# Patient Record
Sex: MALE | Employment: FULL TIME | URBAN - METROPOLITAN AREA
[De-identification: names, ages, dates, MRNs, and addresses within clinical notes are randomized per-mention and may not be internally consistent; named-entity substitution may affect disease eponyms.]

---

## 2023-08-01 ENCOUNTER — OFFICE VISIT (OUTPATIENT)
Dept: FAMILY MEDICINE | Facility: CLINIC | Age: 38
End: 2023-08-01
Payer: COMMERCIAL

## 2023-08-01 VITALS
DIASTOLIC BLOOD PRESSURE: 68 MMHG | WEIGHT: 193 LBS | SYSTOLIC BLOOD PRESSURE: 107 MMHG | TEMPERATURE: 97.9 F | OXYGEN SATURATION: 99 % | HEART RATE: 76 BPM | RESPIRATION RATE: 14 BRPM

## 2023-08-01 DIAGNOSIS — R51.9 NONINTRACTABLE EPISODIC HEADACHE, UNSPECIFIED HEADACHE TYPE: Primary | ICD-10-CM

## 2023-08-01 PROCEDURE — 99203 OFFICE O/P NEW LOW 30 MIN: CPT | Performed by: PHYSICIAN ASSISTANT

## 2023-08-01 RX ORDER — OMEGA-3 FATTY ACIDS/FISH OIL 300-1000MG
200 CAPSULE ORAL EVERY 4 HOURS PRN
COMMUNITY

## 2023-08-01 RX ORDER — SUMATRIPTAN 50 MG/1
50 TABLET, FILM COATED ORAL ONCE
Status: COMPLETED | OUTPATIENT
Start: 2023-08-01 | End: 2023-08-01

## 2023-08-01 RX ORDER — SUMATRIPTAN 50 MG/1
50 TABLET, FILM COATED ORAL
Qty: 30 TABLET | Refills: 0 | Status: SHIPPED | OUTPATIENT
Start: 2023-08-01

## 2023-08-01 RX ADMIN — SUMATRIPTAN 50 MG: 50 TABLET, FILM COATED ORAL at 19:38

## 2023-08-02 NOTE — PROGRESS NOTES
Patient presents with:  Headache: Intermittent headache x 1 month        Clinical Decision Making:  Patient received Imitrex here in the clinic.  He did feel that it was starting to cause his headache to subside.  For this reason Imitrex was sent to the patient's pharmacy.  It is unclear if this is tension headache versus migraine.  There does not seem to be any associated aura.  No neurologic deficits concerning for stroke, intracranial hemorrhage, or mass.    ICD-10-CM    1. Nonintractable episodic headache, unspecified headache type  R51.9 SUMAtriptan (IMITREX) tablet 50 mg     SUMAtriptan (IMITREX) 50 MG tablet          Patient Instructions   1.  Drink plenty of fluids and get  adequate sleep.  Apply a warm compress to the shoulders and a cool compress over the forehead, this contrast can sometimes help with tension type headaches.  2.  If you can afford it getting relaxation massage may help decrease your neck and shoulder tension and relieve your headache pain.  3.  You may continue to use Tylenol as you have been. At first sign of headache take Imitrex. If you still have a headache 2 hours after the dose you can take a second pill. No more than 4 in a 24 hour period.   4.  Follow-up if you are not experience any improvement in your symptoms over the course the next 7-10 days.  5.  Seek emergency medical attention if you develop weakness, confusion, or severe headache, dizziness, or significant vision changes.      HPI:  Alea Estrella is a 38 year old male who presents today complaining of headache on and off x 1 month. Current headache is on the left side of the head. HA usually lasts 3-4 hours. He feels like HA is worsened by his hot working conditions and lack of sleep. He denies any eye watering or nose running with the HA. No hx of migraines. He has had some occasional of nausea and one episode of vomiting associated with HA. He states that he lift a lot at work and reports some tension in his upper  shoulders and neck. He denies any photo or phonophobia. He reports sometimes the pain starts behind and eye and then spreads out, but sometimes its all around the head.     History obtained from the patient.    Problem List:  There are no relevant problems documented for this patient.      No past medical history on file.    Social History     Tobacco Use    Smoking status: Every Day     Types: Cigarettes, Vaping Device    Smokeless tobacco: Current   Substance Use Topics    Alcohol use: Never       Review of Systems    Vitals:    08/01/23 1925   BP: 107/68   Pulse: 76   Resp: 14   Temp: 97.9  F (36.6  C)   SpO2: 99%   Weight: 87.5 kg (193 lb)       Physical Exam  Vitals and nursing note reviewed.   Constitutional:       General: He is not in acute distress.     Appearance: He is not toxic-appearing or diaphoretic.      Comments: Patient holding his left eye when I enter the room.    HENT:      Head: Normocephalic and atraumatic.      Right Ear: External ear normal.      Left Ear: External ear normal.   Eyes:      General:         Right eye: No discharge.         Left eye: No discharge.      Extraocular Movements: Extraocular movements intact.      Conjunctiva/sclera: Conjunctivae normal.      Right eye: Right conjunctiva is not injected.      Left eye: Left conjunctiva is not injected.      Pupils: Pupils are equal, round, and reactive to light.      Comments: No eye watering   Cardiovascular:      Heart sounds: No murmur heard.  Pulmonary:      Effort: Pulmonary effort is normal. No respiratory distress.   Neurological:      Mental Status: He is alert.      GCS: GCS eye subscore is 4. GCS verbal subscore is 5. GCS motor subscore is 6.   Psychiatric:         Mood and Affect: Mood normal.         Behavior: Behavior normal.         Thought Content: Thought content normal.         Judgment: Judgment normal.       At the end of the encounter, I discussed results, diagnosis, medications. Discussed red flags for  immediate return to clinic/ER, as well as indications for follow up if no improvement. Patient understood and agreed to plan. Patient was stable for discharge.

## 2023-08-02 NOTE — PATIENT INSTRUCTIONS
1.  Drink plenty of fluids and get  adequate sleep.  Apply a warm compress to the shoulders and a cool compress over the forehead, this contrast can sometimes help with tension type headaches.  2.  If you can afford it getting relaxation massage may help decrease your neck and shoulder tension and relieve your headache pain.  3.  You may continue to use Tylenol as you have been. At first sign of headache take Imitrex. If you still have a headache 2 hours after the dose you can take a second pill. No more than 4 in a 24 hour period.   4.  Follow-up if you are not experience any improvement in your symptoms over the course the next 7-10 days.  5.  Seek emergency medical attention if you develop weakness, confusion, or severe headache, dizziness, or significant vision changes.

## 2023-12-01 ENCOUNTER — ANCILLARY PROCEDURE (OUTPATIENT)
Dept: GENERAL RADIOLOGY | Facility: CLINIC | Age: 38
End: 2023-12-01
Attending: FAMILY MEDICINE
Payer: COMMERCIAL

## 2023-12-01 ENCOUNTER — OFFICE VISIT (OUTPATIENT)
Dept: FAMILY MEDICINE | Facility: CLINIC | Age: 38
End: 2023-12-01
Payer: COMMERCIAL

## 2023-12-01 VITALS
HEIGHT: 68 IN | SYSTOLIC BLOOD PRESSURE: 109 MMHG | DIASTOLIC BLOOD PRESSURE: 75 MMHG | WEIGHT: 166 LBS | HEART RATE: 96 BPM | OXYGEN SATURATION: 98 % | BODY MASS INDEX: 25.16 KG/M2

## 2023-12-01 DIAGNOSIS — Z23 COVID-19 VACCINE ADMINISTERED: ICD-10-CM

## 2023-12-01 DIAGNOSIS — Z23 ENCOUNTER FOR IMMUNIZATION: ICD-10-CM

## 2023-12-01 DIAGNOSIS — Z86.11 HISTORY OF TB (TUBERCULOSIS): ICD-10-CM

## 2023-12-01 DIAGNOSIS — E78.5 HYPERLIPIDEMIA, UNSPECIFIED HYPERLIPIDEMIA TYPE: ICD-10-CM

## 2023-12-01 DIAGNOSIS — R19.01 RIGHT UPPER QUADRANT ABDOMINAL MASS: Primary | ICD-10-CM

## 2023-12-01 DIAGNOSIS — Z11.4 SCREENING FOR HIV (HUMAN IMMUNODEFICIENCY VIRUS): ICD-10-CM

## 2023-12-01 LAB
ALBUMIN SERPL BCG-MCNC: 4.4 G/DL (ref 3.5–5.2)
ALP SERPL-CCNC: 61 U/L (ref 40–150)
ALT SERPL W P-5'-P-CCNC: 30 U/L (ref 0–70)
ANION GAP SERPL CALCULATED.3IONS-SCNC: 10 MMOL/L (ref 7–15)
AST SERPL W P-5'-P-CCNC: 33 U/L (ref 0–45)
BASOPHILS # BLD AUTO: 0.1 10E3/UL (ref 0–0.2)
BASOPHILS NFR BLD AUTO: 1 %
BILIRUB SERPL-MCNC: 0.6 MG/DL
BUN SERPL-MCNC: 14.1 MG/DL (ref 6–20)
CALCIUM SERPL-MCNC: 9.2 MG/DL (ref 8.6–10)
CHLORIDE SERPL-SCNC: 104 MMOL/L (ref 98–107)
CHOLEST SERPL-MCNC: 130 MG/DL
CREAT SERPL-MCNC: 1.07 MG/DL (ref 0.67–1.17)
DEPRECATED HCO3 PLAS-SCNC: 25 MMOL/L (ref 22–29)
EGFRCR SERPLBLD CKD-EPI 2021: >90 ML/MIN/1.73M2
EOSINOPHIL # BLD AUTO: 0 10E3/UL (ref 0–0.7)
EOSINOPHIL NFR BLD AUTO: 1 %
ERYTHROCYTE [DISTWIDTH] IN BLOOD BY AUTOMATED COUNT: 14.8 % (ref 10–15)
GLUCOSE SERPL-MCNC: 109 MG/DL (ref 70–99)
HCT VFR BLD AUTO: 42.9 % (ref 40–53)
HDLC SERPL-MCNC: 37 MG/DL
HGB BLD-MCNC: 13.7 G/DL (ref 13.3–17.7)
IMM GRANULOCYTES # BLD: 0 10E3/UL
IMM GRANULOCYTES NFR BLD: 0 %
LDLC SERPL CALC-MCNC: 76 MG/DL
LYMPHOCYTES # BLD AUTO: 1.5 10E3/UL (ref 0.8–5.3)
LYMPHOCYTES NFR BLD AUTO: 20 %
MCH RBC QN AUTO: 25.7 PG (ref 26.5–33)
MCHC RBC AUTO-ENTMCNC: 31.9 G/DL (ref 31.5–36.5)
MCV RBC AUTO: 81 FL (ref 78–100)
MONOCYTES # BLD AUTO: 0.4 10E3/UL (ref 0–1.3)
MONOCYTES NFR BLD AUTO: 5 %
NEUTROPHILS # BLD AUTO: 5.3 10E3/UL (ref 1.6–8.3)
NEUTROPHILS NFR BLD AUTO: 73 %
NONHDLC SERPL-MCNC: 93 MG/DL
PLATELET # BLD AUTO: 268 10E3/UL (ref 150–450)
POTASSIUM SERPL-SCNC: 3.6 MMOL/L (ref 3.4–5.3)
PROT SERPL-MCNC: 7.4 G/DL (ref 6.4–8.3)
RBC # BLD AUTO: 5.33 10E6/UL (ref 4.4–5.9)
SODIUM SERPL-SCNC: 139 MMOL/L (ref 135–145)
TRIGL SERPL-MCNC: 86 MG/DL
WBC # BLD AUTO: 7.3 10E3/UL (ref 4–11)

## 2023-12-01 PROCEDURE — 80061 LIPID PANEL: CPT

## 2023-12-01 PROCEDURE — 90472 IMMUNIZATION ADMIN EACH ADD: CPT

## 2023-12-01 PROCEDURE — 80053 COMPREHEN METABOLIC PANEL: CPT

## 2023-12-01 PROCEDURE — 36415 COLL VENOUS BLD VENIPUNCTURE: CPT

## 2023-12-01 PROCEDURE — 99214 OFFICE O/P EST MOD 30 MIN: CPT | Mod: 25

## 2023-12-01 PROCEDURE — 71046 X-RAY EXAM CHEST 2 VIEWS: CPT | Mod: TC | Performed by: RADIOLOGY

## 2023-12-01 PROCEDURE — 91320 SARSCV2 VAC 30MCG TRS-SUC IM: CPT

## 2023-12-01 PROCEDURE — 85025 COMPLETE CBC W/AUTO DIFF WBC: CPT

## 2023-12-01 PROCEDURE — 90480 ADMN SARSCOV2 VAC 1/ONLY CMP: CPT

## 2023-12-01 PROCEDURE — 90677 PCV20 VACCINE IM: CPT

## 2023-12-01 PROCEDURE — 90471 IMMUNIZATION ADMIN: CPT

## 2023-12-01 PROCEDURE — 90715 TDAP VACCINE 7 YRS/> IM: CPT

## 2023-12-01 NOTE — PROGRESS NOTES
I have personally reviewed the history and examination as documented by Dr. Beach.  I was present during key portions of the visit and agree with the assessment and plan as documented for 38 yr old male with hx of latent TB, methamphetamine use here for soft tissue masses overlying liver and ribs w/ night sweats, fatigue, wt loss. Will check labs and CT. Precautions given. Anticipatory guidance given.     Anders Villalobos MD  December 1, 2023  9:28 AM

## 2023-12-01 NOTE — PROGRESS NOTES
Prior to immunization administration, verified patients identity using patient s name and date of birth. Please see Immunization Activity for additional information.     Screening Questionnaire for Adult Immunization    Are you sick today?   No   Do you have allergies to medications, food, a vaccine component or latex?   No   Have you ever had a serious reaction after receiving a vaccination?   No   Do you have a long-term health problem with heart, lung, kidney, or metabolic disease (e.g., diabetes), asthma, a blood disorder, no spleen, complement component deficiency, a cochlear implant, or a spinal fluid leak?  Are you on long-term aspirin therapy?   No   Do you have cancer, leukemia, HIV/AIDS, or any other immune system problem?   No   Do you have a parent, brother, or sister with an immune system problem?   No   In the past 3 months, have you taken medications that affect  your immune system, such as prednisone, other steroids, or anticancer drugs; drugs for the treatment of rheumatoid arthritis, Crohn s disease, or psoriasis; or have you had radiation treatments?   No   Have you had a seizure, or a brain or other nervous system problem?   No   During the past year, have you received a transfusion of blood or blood    products, or been given immune (gamma) globulin or antiviral drug?   No   For women: Are you pregnant or is there a chance you could become       pregnant during the next month?   No   Have you received any vaccinations in the past 4 weeks?   No     Immunization questionnaire answers were all negative.      Patient instructed to remain in clinic for 15 minutes afterwards, and to report any adverse reactions.     Screening performed by Shy Chapin on 12/1/2023 at 9:01 AM.

## 2023-12-01 NOTE — CONFIDENTIAL NOTE
Supplemental substance use hx  - Pt reports ongoing marijuana and methamphetamine use  - Does not wish for his significant other to know about this  - Unable to get further hx on frequency/last use of these substances as significant other in the room throughout visit today  - I do wonder if some of the symptoms  patient describes w/ weight loss and night sweats could partially be explained by methamphetamine intoxication/withdrawal  - Will require further hx gathering and discussion at future visit

## 2023-12-01 NOTE — PROGRESS NOTES
"  Assessment & Plan     History of TB (tuberculosis)  Right upper quadrant abdominal mass  Pt w/ hx of TB treated while incarcerated in 2009, unsure if this was active or latent TB at that time but he does not believe he was symptomatic at the time. Completed 9 mo of treatment per pt report. Today pt presents w/ concern over several weeks to months of weight loss, night sweats, and \"lumps\" noted over the R abdominal wall near the lower ribs. These have been present for many years (he believes since 2007 at least), but states that he believes they are slightly larger in size today. These are not necessarily painful, though he feels like he does get occasional abdominal wall cramping around this area. Giving concerning story, did get a CXR in clinic today, no evidence of obvious tubercular disease. Will get basic labwork and send pt for CT chest/abdomen/pelvis to further evaluate this area.   - CBC with Platelets & Differential  - Comprehensive metabolic panel  - CT Chest Abdomen Pelvis w/o & w Contrast  - XR Chest 2 Views    COVID-19 vaccine administered  - COVID-19 12+ (2023-24) (PFIZER)    Encounter for immunization  - Pneumococcal 20 Valent Conjugate (Prevnar 20)  - TDAP 10-64Y (ADACEL,BOOSTRIX)    Hyperlipidemia, unspecified hyperlipidemia type  - Lipid panel reflex to direct LDL Non-fasting    Nicotine/Tobacco Cessation:  He reports that he has been smoking cigarettes and vaping device. He uses smokeless tobacco.  Nicotine/Tobacco Cessation Plan:   Information offered: Patient not interested at this time    BMI:   Estimated body mass index is 25.24 kg/m  as calculated from the following:    Height as of this encounter: 1.727 m (5' 8\").    Weight as of this encounter: 75.3 kg (166 lb).     No follow-ups on file.    Chritsian Beverly MD  [facility] [place] Subjective Sameth is a 38 year old, presenting for the following health issues:  lumps  (Lumps on the Rt side of the stomach ( middle " "too) ), night sweats  (Started 4 mos ago. Every other night), and weight loss (Per spouse pale, on and off loss of appite )        12/1/2023     8:08 AM   Additional Questions   Roomed by willy briggs   Accompanied by spouse     HPI: Alea is presenting for three soft lumps on the right side of his abdomen. He has had them for years around 2007. He feels they have gotten bigger since he noticed them. They dont get bigger with lifting. He does feel like he gets abdominal muscle spasms with activity. He doesn't report any pain from the lumps. He also reports night sweats starting around three months ago, along with appetite and weight loss. He has also been looking pale. He has also felt more irritable lately. In 2009 he tested positive for TB and was treated for that. After completion he said he had an x-ray which was normal. He smokes marijuana.    Works at [employer] as a  and thinks he may be exposed to volatile chemicals.    Review of Systems   Constitutional, HEENT, cardiovascular, pulmonary, gi and gu systems are negative, except as otherwise noted.        Objective    /75   Pulse 96   Ht 1.727 m (5' 8\")   Wt 75.3 kg (166 lb)   SpO2 98%   BMI 25.24 kg/m    Body mass index is 25.24 kg/m .  Physical Exam   GENERAL: healthy, alert and no distress  EYES: Eyes grossly normal to inspection, PERRL and conjunctivae and sclerae normal  HENT: ear canals and TM's normal, nose and mouth without ulcers or lesions  NECK: no adenopathy, no asymmetry, masses, or scars and thyroid normal to palpation  RESP: lungs clear to auscultation - no rales, rhonchi or wheezes  CV: regular rate and rhythm, normal S1 S2, no S3 or S4, no murmur, click or rub, no peripheral edema and peripheral pulses strong  ABDOMEN: soft, nontender, no organomegaly or masses, bowel sounds normal, mass present on lower right ribcage that is firm, mobile, and nontender roughly half a centimeter in diameter, umbilicus normal, " and no scars, striae, dilated veins, rashes, or lesions  MS: no gross musculoskeletal defects noted, no edema  SKIN: no suspicious lesions or rashes  NEURO: Normal strength and tone, mentation intact and speech normal  PSYCH: mentation appears normal, affect normal/bright    Results for orders placed or performed in visit on 12/01/23 (from the past 24 hour(s))   CBC with Platelets & Differential    Narrative    The following orders were created for panel order CBC with Platelets & Differential.  Procedure                               Abnormality         Status                     ---------                               -----------         ------                     CBC with platelets and d...[[id number]]  Abnormal            Final result                 Please view results for these tests on the individual orders.   CBC with platelets and differential   Result Value Ref Range    WBC Count 7.3 4.0 - 11.0 10e3/uL    RBC Count 5.33 4.40 - 5.90 10e6/uL    Hemoglobin 13.7 13.3 - 17.7 g/dL    Hematocrit 42.9 40.0 - 53.0 %    MCV 81 78 - 100 fL    MCH 25.7 (L) 26.5 - 33.0 pg    MCHC 31.9 31.5 - 36.5 g/dL    RDW 14.8 10.0 - 15.0 %    Platelet Count 268 150 - 450 10e3/uL    % Neutrophils 73 %    % Lymphocytes 20 %    % Monocytes 5 %    % Eosinophils 1 %    % Basophils 1 %    % Immature Granulocytes 0 %    Absolute Neutrophils 5.3 1.6 - 8.3 10e3/uL    Absolute Lymphocytes 1.5 0.8 - 5.3 10e3/uL    Absolute Monocytes 0.4 0.0 - 1.3 10e3/uL    Absolute Eosinophils 0.0 0.0 - 0.7 10e3/uL    Absolute Basophils 0.1 0.0 - 0.2 10e3/uL    Absolute Immature Granulocytes 0.0 <=0.4 10e3/uL     ----- Service Performed and Documented by Resident or Fellow ------

## 2023-12-01 NOTE — COMMUNITY RESOURCES LIST (ENGLISH)
12/01/2023   [facility]  N/A  For questions about this resource list or additional care needs, please contact your primary care clinic or care manager.  Phone: [phone]   Email: N/A   Address: [address] [place] [id number]   Hours: N/A        Financial Stability       Rent and mortgage payment assistance  1  [facility] - [place] Office - Supportive Housing Assistance Program (SHAP) - Rent and mortgage payment assistance Distance: 1.89 miles      Phone/Virtual   [address] [place] [address]  Language: English, Hmong, Sun, Cymraes  Hours: Mon - Fri 8:30 AM - 5:00 PM  Fees: Free   Phone: (875) 623-3879 Email: Nasty Gal@[employer] Website: http://www.[employer].org/hap-impact-areas/     2  [employer]) [facility] Distance: 2.15 miles      In-Person, Phone/Virtual   [address] [place] [address]  Language: English, Guamanian  Hours: Mon - Fri 8:30 AM - 5:00 PM  Fees: Free   Phone: (834) 253-2724 Email: info@[employer].org Website: http://www.[employer].org     Utility payment assistance  3  [facility] ([facility] - [place] Office - Supportive Housing Assistance Program (SHAP) - Utility payment assistance Distance: 1.89 miles      Phone/Virtual   [address] [place] [address]  Language: English, Hmong, Sun, Cymraes  Hours: Mon - Fri 8:30 AM - 5:00 PM  Fees: Free   Phone: (202) 339-2456 Email: fredocade@[employer] Website: http://www.[employer].org/hap-impact-areas/     4  [employer] [facility] Distance: 2.04 miles      Phone/Virtual   [address] [id number]  Language: English, Guamanian  Hours: Mon - Thu 8:00 AM - 4:00 PM , Fri 8:00 AM - 12:00 PM  Fees: Free   Phone: (391) 429-2229 Email: ecc@energycents.org Website: http://energycents.org/          Food and Nutrition       Food pantry  5  [facility] - Food Distribution Program Distance: 1.45 miles       In-Person   [address] [place] [address]  Language: English, Hmong, English  Hours: Tue 3:00 PM - 5:00 PM  Fees: Free   Phone: (456) 692-8146 Email: info@mycirQleD[facility] HospitalTaKaDu[facility].org Website: http://[facility].org/programs/jmtxsr-eeccptrsu-kudqxa/     6  [facility] Distance: 1.45 miles      Pickup   [address] [place] [address]  Language: English  Hours: Mon 10:00 AM - 11:30 AM , Thu 10:00 AM - 11:30 AM  Fees: Free   Phone: (161) 547-6074 Email: info@[employer] Website: https://[employer] Maps/find-support/partner-organizations/housing-assistance/     SNAP application assistance  7  [employer] ([employer]) [facility] Distance: 2.15 miles      In-Person   [address] [place] [address]  Language: English, English  Hours: Mon - Fri 8:30 AM - 5:00 PM  Fees: Free   Phone: (363) 617-1298 Email: info@clues.org Website: http://www.[employer]x.org     8  [facility] Distance: 2.27 miles      Phone/Virtual   [address] [place] [address]  Language: English  Hours: Mon - Thu 9:00 AM - 4:00 PM , Fri 9:00 AM - 2:00 PM , Sun 10:00 AM - 12:00 PM  Fees: Free   Phone: (966) 438-2294 Email: meri@[facility].org Website: http://[facility].org/[facility]/     Soup kitchen or free meals  9  [facility] - [employer] [place] [employer] [place] Distance: 1.45 miles      Pickup   [address] [place] [address]  Language: English  Hours: Wed 5:30 PM - 6:30 PM  Fees: Free   Phone: (303) 834-4251 Email: office@orlcmn.org Website: https://www.jose raulfishesmn.org     10  [facility] Distance: 2.27 miles      [address] [address]  Language: English  Hours: Mon - Fri 11:45 AM - 12:45 PM  Fees: Free   Phone: (732) 422-5218 Email: meri@[facility] Website:  http://salvationarmynorth.org/[facility]/          Transportation       Free or low-cost transportation  11  [employer] - Free or low-cost transportation Distance: 5.53 miles      In-Person   [address] [place] [address]  Language: English  Hours: Mon - Fri 9:00 AM - 5:00 PM  Fees: Free   Phone: (902) 341-6834 Email: info@[employer] Website: https://www.[employer]Couch/     12  [employer] [place] [employer] Distance: 5.82 miles      In-Person   [address] [place] [address]  Language: English  Hours: Tue 9:00 AM - 2:00 PM  Fees: Self Pay   Phone: (246) 548-2380 Email: info@[employer] Website: http://www.[employer].org/     Transportation to medical appointments  13  [employer] - Non-Emergency Medical Transportation Distance: 4.48 miles      In-Person   [address] [place] [address]  Language: English  Hours: Mon - Fri 8:00 AM - 4:00 PM Appt. Only  Fees: Self Pay   Phone: (105) 328-4365 Website: http://www.[employer].org/Medical-Services/Emergency-medical-services/Non-emergency-transportation/     14  [employer] Distance: 4.67 miles      In-Person   [address] [address]  Language: English  Hours: Mon - Thu 6:00 AM - 6:00 PM , Fri 6:00 AM - 5:00 PM  Fees: Insurance, Self Pay   Phone: (472) 594-7409 Email: office@[employer] Website: https://www.[employer]/          Important Numbers & Websites       Emergency Services   911  City Services   311  Poison Control   (425) 869-7660  Suicide Prevention Lifeline   (455) 129-1193 (TALK)  Child Abuse Hotline   (921) 700-8400 (4-A-Child)  Sexual Assault Hotline   (387) 511-4323 (HOPE)  National Runaway Safeline   (272) 303-2486 (RUNAWAY)  All-Options Talkline   (571) 516-5862  Substance Abuse Referral   (614) 706-6212 (HELP)

## 2023-12-08 ENCOUNTER — TELEPHONE (OUTPATIENT)
Dept: FAMILY MEDICINE | Facility: CLINIC | Age: 38
End: 2023-12-08
Payer: COMMERCIAL

## 2023-12-08 NOTE — TELEPHONE ENCOUNTER
Calling pt to check in after recent clinic visit. Pt was seen to establish care with myself 12/1. At that time had noted hx of TB (unsure if this was latent or active) while incarcerated in 2009. Reports he was given 9 mo of treatment for this but did not remember much else. He has now noted recent unintentional weight loss, night sweats, and soft tissue masses over the R abdominal wall. CXR in clinic did not appear to show any pulmonary process, and CT chest abdomen pelvis was ordered. It appears pt was called to set this up though they were unable to reach pt and left VM.     Results of recent labwork largely unremarkable, w/ CBC, BMP, and lipid panel all WNL.     Unfortunately I was unable to get ahold of Alea today, will try again after the weekend.

## 2023-12-20 ENCOUNTER — HOSPITAL ENCOUNTER (OUTPATIENT)
Dept: CT IMAGING | Facility: HOSPITAL | Age: 38
Discharge: HOME | End: 2023-12-20
Attending: FAMILY MEDICINE | Admitting: FAMILY MEDICINE
Payer: COMMERCIAL

## 2023-12-20 DIAGNOSIS — R19.01 RIGHT UPPER QUADRANT ABDOMINAL MASS: ICD-10-CM

## 2023-12-20 PROCEDURE — 74177 CT ABD & PELVIS W/CONTRAST: CPT

## 2023-12-20 PROCEDURE — 250N000011 HC RX IP 250 OP 636: Mod: JZ | Performed by: FAMILY MEDICINE

## 2023-12-20 RX ORDER — IOPAMIDOL 755 MG/ML
90 INJECTION, SOLUTION INTRAVASCULAR ONCE
Status: COMPLETED | OUTPATIENT
Start: 2023-12-20 | End: 2023-12-20

## 2023-12-20 RX ADMIN — IOPAMIDOL 90 ML: 755 INJECTION, SOLUTION INTRAVENOUS at 16:49

## 2023-12-28 ENCOUNTER — TELEPHONE (OUTPATIENT)
Dept: FAMILY MEDICINE | Facility: CLINIC | Age: 38
End: 2023-12-28
Payer: COMMERCIAL

## 2023-12-28 NOTE — TELEPHONE ENCOUNTER
----- Message from Christian Beverly MD sent at 12/27/2023  5:55 PM CST -----  Team - please call pt to let him know that his recent CT scan of his chest and abdomen came back very normal. No evidence of TB lung disease. Small nodules seen on the abdominal wall are non specific. It is unclear what these are but do not appear consistent w/ any enlarging or concerning tumor. Will need to follow this clinically to see if they are growing.

## 2024-02-10 ENCOUNTER — HEALTH MAINTENANCE LETTER (OUTPATIENT)
Age: 39
End: 2024-02-10

## 2024-07-20 ENCOUNTER — HOSPITAL ENCOUNTER (EMERGENCY)
Facility: HOSPITAL | Age: 39
Discharge: HOME | End: 2024-07-20
Attending: EMERGENCY MEDICINE | Admitting: EMERGENCY MEDICINE
Payer: COMMERCIAL

## 2024-07-20 ENCOUNTER — APPOINTMENT (OUTPATIENT)
Dept: CT IMAGING | Facility: HOSPITAL | Age: 39
End: 2024-07-20
Attending: EMERGENCY MEDICINE
Payer: COMMERCIAL

## 2024-07-20 VITALS
HEIGHT: 67 IN | SYSTOLIC BLOOD PRESSURE: 132 MMHG | WEIGHT: 200 LBS | RESPIRATION RATE: 20 BRPM | BODY MASS INDEX: 31.39 KG/M2 | HEART RATE: 80 BPM | DIASTOLIC BLOOD PRESSURE: 79 MMHG | OXYGEN SATURATION: 97 % | TEMPERATURE: 97.9 F

## 2024-07-20 DIAGNOSIS — R51.9 NONINTRACTABLE HEADACHE, UNSPECIFIED CHRONICITY PATTERN, UNSPECIFIED HEADACHE TYPE: ICD-10-CM

## 2024-07-20 LAB
ANION GAP SERPL CALCULATED.3IONS-SCNC: 8 MMOL/L (ref 7–15)
BASOPHILS # BLD AUTO: 0.1 10E3/UL (ref 0–0.2)
BASOPHILS NFR BLD AUTO: 1 %
BUN SERPL-MCNC: 14.6 MG/DL (ref 6–20)
CALCIUM SERPL-MCNC: 8.9 MG/DL (ref 8.8–10.4)
CHLORIDE SERPL-SCNC: 108 MMOL/L (ref 98–107)
CREAT SERPL-MCNC: 1.01 MG/DL (ref 0.67–1.17)
EGFRCR SERPLBLD CKD-EPI 2021: >90 ML/MIN/1.73M2
EOSINOPHIL # BLD AUTO: 0.3 10E3/UL (ref 0–0.7)
EOSINOPHIL NFR BLD AUTO: 4 %
ERYTHROCYTE [DISTWIDTH] IN BLOOD BY AUTOMATED COUNT: 13.7 % (ref 10–15)
GLUCOSE SERPL-MCNC: 106 MG/DL (ref 70–99)
HCO3 SERPL-SCNC: 24 MMOL/L (ref 22–29)
HCT VFR BLD AUTO: 39.3 % (ref 40–53)
HGB BLD-MCNC: 13 G/DL (ref 13.3–17.7)
IMM GRANULOCYTES # BLD: 0.1 10E3/UL
IMM GRANULOCYTES NFR BLD: 1 %
LYMPHOCYTES # BLD AUTO: 1.1 10E3/UL (ref 0.8–5.3)
LYMPHOCYTES NFR BLD AUTO: 12 %
MCH RBC QN AUTO: 26.2 PG (ref 26.5–33)
MCHC RBC AUTO-ENTMCNC: 33.1 G/DL (ref 31.5–36.5)
MCV RBC AUTO: 79 FL (ref 78–100)
MONOCYTES # BLD AUTO: 0.4 10E3/UL (ref 0–1.3)
MONOCYTES NFR BLD AUTO: 5 %
NEUTROPHILS # BLD AUTO: 7.1 10E3/UL (ref 1.6–8.3)
NEUTROPHILS NFR BLD AUTO: 79 %
NRBC # BLD AUTO: 0 10E3/UL
NRBC BLD AUTO-RTO: 0 /100
PLATELET # BLD AUTO: 220 10E3/UL (ref 150–450)
POTASSIUM SERPL-SCNC: 3.7 MMOL/L (ref 3.4–5.3)
RBC # BLD AUTO: 4.96 10E6/UL (ref 4.4–5.9)
SODIUM SERPL-SCNC: 140 MMOL/L (ref 135–145)
WBC # BLD AUTO: 9.1 10E3/UL (ref 4–11)

## 2024-07-20 PROCEDURE — 36415 COLL VENOUS BLD VENIPUNCTURE: CPT | Performed by: EMERGENCY MEDICINE

## 2024-07-20 PROCEDURE — 99285 EMERGENCY DEPT VISIT HI MDM: CPT | Mod: 25

## 2024-07-20 PROCEDURE — 250N000011 HC RX IP 250 OP 636: Performed by: EMERGENCY MEDICINE

## 2024-07-20 PROCEDURE — 96361 HYDRATE IV INFUSION ADD-ON: CPT

## 2024-07-20 PROCEDURE — 258N000003 HC RX IP 258 OP 636: Performed by: EMERGENCY MEDICINE

## 2024-07-20 PROCEDURE — 96374 THER/PROPH/DIAG INJ IV PUSH: CPT

## 2024-07-20 PROCEDURE — 70450 CT HEAD/BRAIN W/O DYE: CPT

## 2024-07-20 PROCEDURE — 96375 TX/PRO/DX INJ NEW DRUG ADDON: CPT

## 2024-07-20 PROCEDURE — 80048 BASIC METABOLIC PNL TOTAL CA: CPT | Performed by: EMERGENCY MEDICINE

## 2024-07-20 PROCEDURE — 85025 COMPLETE CBC W/AUTO DIFF WBC: CPT | Performed by: EMERGENCY MEDICINE

## 2024-07-20 RX ORDER — METOCLOPRAMIDE HYDROCHLORIDE 5 MG/ML
10 INJECTION INTRAMUSCULAR; INTRAVENOUS ONCE
Status: COMPLETED | OUTPATIENT
Start: 2024-07-20 | End: 2024-07-20

## 2024-07-20 RX ORDER — DIPHENHYDRAMINE HYDROCHLORIDE 50 MG/ML
25 INJECTION INTRAMUSCULAR; INTRAVENOUS ONCE
Status: COMPLETED | OUTPATIENT
Start: 2024-07-20 | End: 2024-07-20

## 2024-07-20 RX ORDER — KETOROLAC TROMETHAMINE 15 MG/ML
15 INJECTION, SOLUTION INTRAMUSCULAR; INTRAVENOUS ONCE
Status: COMPLETED | OUTPATIENT
Start: 2024-07-20 | End: 2024-07-20

## 2024-07-20 RX ADMIN — DIPHENHYDRAMINE HYDROCHLORIDE 25 MG: 50 INJECTION, SOLUTION INTRAMUSCULAR; INTRAVENOUS at 05:38

## 2024-07-20 RX ADMIN — SODIUM CHLORIDE 500 ML: 9 INJECTION, SOLUTION INTRAVENOUS at 05:41

## 2024-07-20 RX ADMIN — KETOROLAC TROMETHAMINE 15 MG: 15 INJECTION, SOLUTION INTRAMUSCULAR; INTRAVENOUS at 05:38

## 2024-07-20 RX ADMIN — METOCLOPRAMIDE 10 MG: 5 INJECTION, SOLUTION INTRAMUSCULAR; INTRAVENOUS at 05:38

## 2024-07-20 ASSESSMENT — COLUMBIA-SUICIDE SEVERITY RATING SCALE - C-SSRS
6. HAVE YOU EVER DONE ANYTHING, STARTED TO DO ANYTHING, OR PREPARED TO DO ANYTHING TO END YOUR LIFE?: NO
1. IN THE PAST MONTH, HAVE YOU WISHED YOU WERE DEAD OR WISHED YOU COULD GO TO SLEEP AND NOT WAKE UP?: NO
2. HAVE YOU ACTUALLY HAD ANY THOUGHTS OF KILLING YOURSELF IN THE PAST MONTH?: NO

## 2024-07-20 ASSESSMENT — ACTIVITIES OF DAILY LIVING (ADL)
ADLS_ACUITY_SCORE: 35
ADLS_ACUITY_SCORE: 35

## 2024-07-20 ASSESSMENT — ENCOUNTER SYMPTOMS: FEVER: 0

## 2024-07-20 NOTE — Clinical Note
Alea Estrella was seen and treated in our emergency department on 7/20/2024.  He may return to work on 07/21/2024.       If you have any questions or concerns, please don't hesitate to call.      Richi Pierson MD

## 2024-07-20 NOTE — ED NOTES
Bed: JNED-05  Expected date: 7/20/24  Expected time: 4:56 AM  Means of arrival: Ambulance  Comments:  Majo. 40 yo male with dizziness and headache. IV established. Vitally stable.

## 2024-07-20 NOTE — ED PROVIDER NOTES
EMERGENCY DEPARTMENT ENCOUNTER      NAME: Alea Estrella  AGE: 39 year old male  YOB: 1985  MRN: [id number]  EVALUATION DATE & TIME: 7/20/2024  4:54 AM    PCP: Christian Beverly    ED PROVIDER: Richi Pierson MD      Chief Complaint   Patient presents with    Headache    Dizziness         FINAL IMPRESSION:  1. Nonintractable headache, unspecified chronicity pattern, unspecified headache type          ED COURSE & MEDICAL DECISION MAKING:    Pertinent Labs & Imaging studies reviewed. (See chart for details)  39 year old male presents to the Emergency Department for evaluation of headache    A lumbar puncture and head CT were deemed unecessary because the patient's symptoms did not indicate a subarachnoid hemorrhage, mass lesion, intracerebralhemmorhage or meningitis; The patient had no fever, rash, meningismus, the headache did not come on suddenly, and the patient's neurologic examination was normal.    Normally occur at work per his wife who provides additional history.    She reports gradual onset headache that was worse headache of life.    Headache started around 9 PM.  Took Advil around that time.  Reports headaches improving.    Denies history of aneurysm.  Denies any fever.  Denies any URI symptoms    GCS 15.    Will obtain CT head since worse headache of life, doubt ruptured aneurysm however, will obtain BMP since patient worried by dehydration.  Nobody else at work is sick.  Doubt carbon monoxide.    Given IV fluids, Toradol, Reglan Benadryl  ED Course as of 07/20/24 0624   Sat Jul 20, 2024   0623 My independent review of head CT is negative for intracranial hemorrhage.  CT does show changes just of sinusitis the patient denies any symptoms suggestive of sinusitis   0623 Headaches improved.  Here with his wife.  Plan for discharge home   0623 Based on history and exam I do not feel LP is indicated       Medical Decision Making  Obtained supplemental history:Supplemental history obtained?: Documented  in chart and Family Member/Significant Other  Reviewed external records: External records reviewed?: Documented in chart  Care impacted by chronic illness:N/A  Care significantly affected by social determinants of health:Access to Medical Care  Did you consider but not order tests?: Work up considered but not performed and documented in chart, if applicable  Did you interpret images independently?: Independent interpretation of ECG and images noted in documentation, when applicable.  Consultation discussion with other provider:Did you involve another provider (consultant, , pharmacy, etc.)?: No  Discharge. No recommendations on prescription strength medication(s). N/A.    At the conclusion of the encounter I discussed the results of all of the tests and the disposition. The questions were answered. The patient or family acknowledged understanding and was agreeable with the care plan.       MEDICATIONS GIVEN IN THE EMERGENCY:  Medications   sodium chloride 0.9% BOLUS 500 mL (500 mLs Intravenous $New Bag 7/20/24 0541)   ketorolac (TORADOL) injection 15 mg (15 mg Intravenous $Given 7/20/24 0538)   metoclopramide (REGLAN) injection 10 mg (10 mg Intravenous $Given 7/20/24 0538)   diphenhydrAMINE (BENADRYL) injection 25 mg (25 mg Intravenous $Given 7/20/24 0538)       NEW PRESCRIPTIONS STARTED AT TODAY'S ER VISIT  New Prescriptions    No medications on file          =================================================================    HPI    Patient information was obtained from: patient  ]  Alea Estrella is a 39 year old male with a pertinent history of migraines who presents to this ED  for evaluation of headache and dizziness.  Reports headache progressively got worse and dizziness began around 1 hour prior to arrival.  Patient headache started around 9 PM.  Nobody at work had headache    Denied URI symptoms    Denies neck stiffness.  No fever.    No aneurysms      REVIEW OF SYSTEMS   Review of Systems   Constitutional:   "Negative for fever.        No fever    PAST MEDICAL HISTORY:  No past medical history on file.    PAST SURGICAL HISTORY:  No past surgical history on file.        CURRENT MEDICATIONS:    ibuprofen (ADVIL/MOTRIN) 200 MG capsule  SUMAtriptan (IMITREX) 50 MG tablet        ALLERGIES:  No Known Allergies    FAMILY HISTORY:  Family History   Problem Relation Age of Onset    Family History Negative No family hx of        SOCIAL HISTORY:   Social History     Socioeconomic History    Marital status: Single   Tobacco Use    Smoking status: Every Day     Types: Cigarettes, Vaping Device    Smokeless tobacco: Current   Substance and Sexual Activity    Alcohol use: Never    Drug use: Yes     Types: Marijuana   Social History Narrative    12/1/2023    Children: Cyn 15, Teto 8        Education: GED    Employed             Social Determinants of Health     Financial Resource Strain: High Risk (12/1/2023)    Financial Resource Strain     Within the past 12 months, have you or your family members you live with been unable to get utilities (heat, electricity) when it was really needed?: Yes   Food Insecurity: High Risk (12/1/2023)    Food Insecurity     Within the past 12 months, did you worry that your food would run out before you got money to buy more?: Yes     Within the past 12 months, did the food you bought just not last and you didn t have money to get more?: Yes   Transportation Needs: High Risk (12/1/2023)    Transportation Needs     Within the past 12 months, has lack of transportation kept you from medical appointments, getting your medicines, non-medical meetings or appointments, work, or from getting things that you need?: Yes   Housing Stability: High Risk (12/1/2023)    Housing Stability     Do you have housing? : Yes     Are you worried about losing your housing?: Yes       VITALS:  /79   Pulse 80   Temp 97.9  F (36.6  C) (Oral)   Resp 20   Ht 1.702 m (5' 7\")   Wt 90.7 kg (200 lb)   SpO2 97%   BMI " "31.32 kg/m      PHYSICAL EXAM      Vitals: /79   Pulse 80   Temp 97.9  F (36.6  C) (Oral)   Resp 20   Ht 1.702 m (5' 7\")   Wt 90.7 kg (200 lb)   SpO2 97%   BMI 31.32 kg/m    General: Appears in no acute distress, awake, alert, interactive.  Eyes: Conjunctivae non-injected. Sclera anicteric.  Pupils equal round and reactive to light  HENT: Atraumatic.  No maxillary sinus tenderness  Neck: Supple.  Respiratory/Chest: Respiration unlabored.  Abdomen: non distended  Musculoskeletal: Normal extremities. No edema or erythema.  Skin: Normal color. No rash or diaphoresis.  Neurologic: Face symmetric, moves all extremities spontaneously. Speech clear.  Cranial nerves II through XII intact grossly  Psychiatric: Oriented to person, place, and time. Affect appropriate.    LAB:  All pertinent labs reviewed and interpreted.  Results for orders placed or performed during the hospital encounter of 07/20/24   Head CT w/o contrast    Impression    IMPRESSION:  1.  No acute intracranial abnormality.    2.  Small right maxillary sinus fluid level. Correlate for sinusitis.   Basic metabolic panel   Result Value Ref Range    Sodium 140 135 - 145 mmol/L    Potassium 3.7 3.4 - 5.3 mmol/L    Chloride 108 (H) 98 - 107 mmol/L    Carbon Dioxide (CO2) 24 22 - 29 mmol/L    Anion Gap 8 7 - 15 mmol/L    Urea Nitrogen 14.6 6.0 - 20.0 mg/dL    Creatinine 1.01 0.67 - 1.17 mg/dL    GFR Estimate >90 >60 mL/min/1.73m2    Calcium 8.9 8.8 - 10.4 mg/dL    Glucose 106 (H) 70 - 99 mg/dL   CBC with platelets and differential   Result Value Ref Range    WBC Count 9.1 4.0 - 11.0 10e3/uL    RBC Count 4.96 4.40 - 5.90 10e6/uL    Hemoglobin 13.0 (L) 13.3 - 17.7 g/dL    Hematocrit 39.3 (L) 40.0 - 53.0 %    MCV 79 78 - 100 fL    MCH 26.2 (L) 26.5 - 33.0 pg    MCHC 33.1 31.5 - 36.5 g/dL    RDW 13.7 10.0 - 15.0 %    Platelet Count 220 150 - 450 10e3/uL    % Neutrophils 79 %    % Lymphocytes 12 %    % Monocytes 5 %    % Eosinophils 4 %    % Basophils 1 % "    % Immature Granulocytes 1 %    NRBCs per 100 WBC 0 <1 /100    Absolute Neutrophils 7.1 1.6 - 8.3 10e3/uL    Absolute Lymphocytes 1.1 0.8 - 5.3 10e3/uL    Absolute Monocytes 0.4 0.0 - 1.3 10e3/uL    Absolute Eosinophils 0.3 0.0 - 0.7 10e3/uL    Absolute Basophils 0.1 0.0 - 0.2 10e3/uL    Absolute Immature Granulocytes 0.1 <=0.4 10e3/uL    Absolute NRBCs 0.0 10e3/uL       RADIOLOGY:  Reviewed all pertinent imaging. Please see official radiology report.  Head CT w/o contrast   Final Result   IMPRESSION:   1.  No acute intracranial abnormality.      2.  Small right maxillary sinus fluid level. Correlate for sinusitis.              Richi Pierson MD  [facility] EMERGENCY DEPARTMENT  [facility] [facility] [id number] [phone]        Richi Pierson MD  07/20/24 0684

## 2024-07-20 NOTE — ED TRIAGE NOTES
Pt arrives via EMS from work due to a headache and dizziness. He report the headache has been getting progressively worse and dizziness began around 1 hour prior to arrival. Pt is alert and oriented, calm and cooperative. No other neurological complaints or symptoms at this time.

## 2024-07-22 ENCOUNTER — PATIENT OUTREACH (OUTPATIENT)
Dept: CARE COORDINATION | Facility: CLINIC | Age: 39
End: 2024-07-22
Payer: COMMERCIAL

## 2024-07-22 NOTE — PROGRESS NOTES
Clinic Care Coordination Contact  Follow Up Progress Note      Assessment:  The pt was recently in the ED, I called to check up on the pt, and help the pt setup a ED follow up. The pt was at [facility] for headache, and dizziness. I called and talked to the pt, pt stated that he is doing fine now. He did not feel that the pt needs a follow up.    Care Gaps:    Health Maintenance Due   Topic Date Due    YEARLY PREVENTIVE VISIT  Never done    ADVANCE CARE PLANNING  Never done    IPV IMMUNIZATION (2 of 3 - 4-dose series) 10/03/1990    HIV SCREENING  Never done    HEPATITIS C SCREENING  Never done    HEPATITIS B IMMUNIZATION (1 of 3 - 19+ 3-dose series) Never done    PHQ-2 (once per calendar year)  01/01/2024

## 2025-03-02 ENCOUNTER — HEALTH MAINTENANCE LETTER (OUTPATIENT)
Age: 40
End: 2025-03-02